# Patient Record
Sex: MALE | Race: WHITE | Employment: FULL TIME | URBAN - METROPOLITAN AREA
[De-identification: names, ages, dates, MRNs, and addresses within clinical notes are randomized per-mention and may not be internally consistent; named-entity substitution may affect disease eponyms.]

---

## 2019-08-01 ENCOUNTER — APPOINTMENT (OUTPATIENT)
Dept: CT IMAGING | Facility: CLINIC | Age: 34
End: 2019-08-01
Attending: FAMILY MEDICINE
Payer: COMMERCIAL

## 2019-08-01 ENCOUNTER — HOSPITAL ENCOUNTER (EMERGENCY)
Facility: CLINIC | Age: 34
Discharge: HOME | End: 2019-08-01
Attending: FAMILY MEDICINE | Admitting: FAMILY MEDICINE
Payer: COMMERCIAL

## 2019-08-01 VITALS
RESPIRATION RATE: 16 BRPM | TEMPERATURE: 97.8 F | WEIGHT: 220 LBS | OXYGEN SATURATION: 99 % | HEART RATE: 80 BPM | SYSTOLIC BLOOD PRESSURE: 114 MMHG | DIASTOLIC BLOOD PRESSURE: 75 MMHG

## 2019-08-01 DIAGNOSIS — R10.32 LLQ ABDOMINAL PAIN: ICD-10-CM

## 2019-08-01 LAB
ALBUMIN SERPL-MCNC: 4.1 G/DL (ref 3.4–5)
ALP SERPL-CCNC: 69 U/L (ref 40–150)
ALT SERPL W P-5'-P-CCNC: 40 U/L (ref 0–70)
ANION GAP SERPL CALCULATED.3IONS-SCNC: 8 MMOL/L (ref 3–14)
AST SERPL W P-5'-P-CCNC: 20 U/L (ref 0–45)
BASOPHILS # BLD AUTO: 0.1 10E9/L (ref 0–0.2)
BASOPHILS NFR BLD AUTO: 0.8 %
BILIRUB SERPL-MCNC: 0.3 MG/DL (ref 0.2–1.3)
BUN SERPL-MCNC: 18 MG/DL (ref 7–30)
CALCIUM SERPL-MCNC: 9.2 MG/DL (ref 8.5–10.1)
CHLORIDE SERPL-SCNC: 107 MMOL/L (ref 94–109)
CO2 SERPL-SCNC: 26 MMOL/L (ref 20–32)
CREAT SERPL-MCNC: 0.85 MG/DL (ref 0.66–1.25)
CRP SERPL-MCNC: <2.9 MG/L (ref 0–8)
DIFFERENTIAL METHOD BLD: NORMAL
EOSINOPHIL # BLD AUTO: 0.3 10E9/L (ref 0–0.7)
EOSINOPHIL NFR BLD AUTO: 3.1 %
ERYTHROCYTE [DISTWIDTH] IN BLOOD BY AUTOMATED COUNT: 12.9 % (ref 10–15)
GFR SERPL CREATININE-BSD FRML MDRD: >90 ML/MIN/{1.73_M2}
GLUCOSE SERPL-MCNC: 86 MG/DL (ref 70–99)
HCT VFR BLD AUTO: 43.2 % (ref 40–53)
HGB BLD-MCNC: 14 G/DL (ref 13.3–17.7)
IMM GRANULOCYTES # BLD: 0 10E9/L (ref 0–0.4)
IMM GRANULOCYTES NFR BLD: 0.3 %
LYMPHOCYTES # BLD AUTO: 2.8 10E9/L (ref 0.8–5.3)
LYMPHOCYTES NFR BLD AUTO: 33 %
MCH RBC QN AUTO: 27.8 PG (ref 26.5–33)
MCHC RBC AUTO-ENTMCNC: 32.4 G/DL (ref 31.5–36.5)
MCV RBC AUTO: 86 FL (ref 78–100)
MONOCYTES # BLD AUTO: 0.7 10E9/L (ref 0–1.3)
MONOCYTES NFR BLD AUTO: 7.8 %
NEUTROPHILS # BLD AUTO: 4.7 10E9/L (ref 1.6–8.3)
NEUTROPHILS NFR BLD AUTO: 55 %
NRBC # BLD AUTO: 0 10*3/UL
NRBC BLD AUTO-RTO: 0 /100
PLATELET # BLD AUTO: 281 10E9/L (ref 150–450)
POTASSIUM SERPL-SCNC: 4 MMOL/L (ref 3.4–5.3)
PROT SERPL-MCNC: 7.9 G/DL (ref 6.8–8.8)
RBC # BLD AUTO: 5.04 10E12/L (ref 4.4–5.9)
SODIUM SERPL-SCNC: 141 MMOL/L (ref 133–144)
WBC # BLD AUTO: 8.6 10E9/L (ref 4–11)

## 2019-08-01 PROCEDURE — 96375 TX/PRO/DX INJ NEW DRUG ADDON: CPT | Performed by: FAMILY MEDICINE

## 2019-08-01 PROCEDURE — 99285 EMERGENCY DEPT VISIT HI MDM: CPT | Mod: 25 | Performed by: FAMILY MEDICINE

## 2019-08-01 PROCEDURE — 74177 CT ABD & PELVIS W/CONTRAST: CPT

## 2019-08-01 PROCEDURE — 99285 EMERGENCY DEPT VISIT HI MDM: CPT | Mod: Z6 | Performed by: FAMILY MEDICINE

## 2019-08-01 PROCEDURE — 85025 COMPLETE CBC W/AUTO DIFF WBC: CPT | Performed by: FAMILY MEDICINE

## 2019-08-01 PROCEDURE — 25000128 H RX IP 250 OP 636: Performed by: FAMILY MEDICINE

## 2019-08-01 PROCEDURE — 80053 COMPREHEN METABOLIC PANEL: CPT | Performed by: FAMILY MEDICINE

## 2019-08-01 PROCEDURE — 96374 THER/PROPH/DIAG INJ IV PUSH: CPT | Mod: 59 | Performed by: FAMILY MEDICINE

## 2019-08-01 PROCEDURE — 86140 C-REACTIVE PROTEIN: CPT | Performed by: FAMILY MEDICINE

## 2019-08-01 RX ORDER — ONDANSETRON 2 MG/ML
4 INJECTION INTRAMUSCULAR; INTRAVENOUS
Status: DISCONTINUED | OUTPATIENT
Start: 2019-08-01 | End: 2019-08-02 | Stop reason: HOSPADM

## 2019-08-01 RX ORDER — IOPAMIDOL 755 MG/ML
100 INJECTION, SOLUTION INTRAVASCULAR ONCE
Status: COMPLETED | OUTPATIENT
Start: 2019-08-01 | End: 2019-08-01

## 2019-08-01 RX ORDER — KETOROLAC TROMETHAMINE 15 MG/ML
15 INJECTION, SOLUTION INTRAMUSCULAR; INTRAVENOUS ONCE
Status: COMPLETED | OUTPATIENT
Start: 2019-08-01 | End: 2019-08-01

## 2019-08-01 RX ADMIN — KETOROLAC TROMETHAMINE 15 MG: 15 INJECTION, SOLUTION INTRAMUSCULAR; INTRAVENOUS at 21:01

## 2019-08-01 RX ADMIN — ONDANSETRON 4 MG: 2 INJECTION INTRAMUSCULAR; INTRAVENOUS at 20:59

## 2019-08-01 RX ADMIN — IOPAMIDOL 500 ML: 755 INJECTION, SOLUTION INTRAVENOUS at 21:28

## 2019-08-01 NOTE — ED AVS SNAPSHOT
[facility] Emergency Department  [address] [address]  Phone:  [phone]  Fax:  [phone]                                    Emir Glass   MRN: [id number]    Department:  [facility] Emergency Department   Date of Visit:  8/1/2019           After Visit Summary Signature Page    I have received my discharge instructions, and my questions have been answered. I have discussed any challenges I see with this plan with the nurse or doctor.    ..........................................................................................................................................  Patient/Patient Representative Signature      ..........................................................................................................................................  Patient Representative Print Name and Relationship to Patient    ..................................................               ................................................  Date                                   Time    ..........................................................................................................................................  Reviewed by Signature/Title    ...................................................              ..............................................  Date                                               Time          22EPIC Rev 08/18

## 2019-08-02 NOTE — ED PROVIDER NOTES
HPI   The patient is a 33-year-old male presenting with right lower quadrant abdominal pain.  Comes from the urgency center where they told him or his urine was unremarkable but that he needed imaging to look for appendicitis.  The patient does not have a known history of surgery.  He does not take medication on a regular basis.  He does not smoke.  He does not use drugs of abuse.  He does not drink alcohol to excess.    The patient had onset of right lower quadrant pain starting yesterday at about 4:00 PM.  The pain is been constant since onset though it did seem to lessen overnight as he was able to sleep.  Throughout the day today his pain is been constant and steady, progressively worsened with time.        Allergies:  No Known Allergies  Problem List:    There are no active problems to display for this patient.     Past Medical History:    No past medical history on file.  Past Surgical History:    No past surgical history on file.  Family History:    No family history on file.  Social History:  Marital Status:   [2]  Social History     Tobacco Use     Smoking status: Not on file   Substance Use Topics     Alcohol use: Not on file     Drug use: Not on file      Medications:      No current outpatient medications on file.  Review of Systems   All other systems reviewed and are negative.      PE   BP: (!) 163/130  Pulse: 102  Temp: 97.7  F (36.5  C)  Resp: 16  Weight: 99.8 kg (220 lb)  SpO2: 98 %  Physical Exam   Constitutional: He is oriented to person, place, and time. He appears distressed.   Mild pain.   HENT:   Head: Atraumatic.   Mouth/Throat: Oropharynx is clear and moist.   Eyes: Pupils are equal, round, and reactive to light. EOM are normal. No scleral icterus.   Neck: Normal range of motion.   Cardiovascular: Normal heart sounds and intact distal pulses.   Pulmonary/Chest: Breath sounds normal. No respiratory distress.   Abdominal: Soft. Bowel sounds are normal. There is tenderness in the right  lower quadrant.   Tenderness in the right lower quadrant.  Soft throughout.  No grimace or evidence of guarding.   Musculoskeletal: He exhibits no edema or tenderness.   Neurological: He is alert and oriented to person, place, and time.   Skin: Skin is warm. No rash noted. He is not diaphoretic.   Psychiatric: He has a normal mood and affect. His behavior is normal.   Nursing note and vitals reviewed.      ED COURSE and [facility]   2234.  The patient presented with right lower quadrant pain and tenderness.  His white blood cell count is negative.  I spoke with the general surgeon on who is Dr. Keating.  He was able to look at the CT images himself and was not impressed with the changes described by radiology.  His recommendation was to a CRP to the blood drawn.  If this is positive, the patient will be admitted summation this.  If this is negative, the patient will be discharged home and follow-up in the clinic.  I reviewed all of this with the patient and he agrees with the plan.    LABS  Labs Ordered and Resulted from Time of ED Arrival Up to the Time of Departure from the ED   CBC WITH PLATELETS DIFFERENTIAL   COMPREHENSIVE METABOLIC PANEL   CRP INFLAMMATION       IMAGING  Images reviewed by me.  Radiology report also reviewed.  Abd/pelvis CT, IV contrast only TRAUMA  / AAA   Preliminary Result   IMPRESSION:    1. Mildly prominent appendix, cannot exclude very early appendicitis   but no other signs for acute appendicitis and clinical correlation   needed or consider short-term follow-up.   2. There are a few mildly prominent mesenteric nodes in the right   midabdomen which are nonspecific, adjacent mesenteric fat appears   normal.                         Medications   ondansetron (ZOFRAN) injection 4 mg (4 mg Intravenous Given 8/1/19 2059)   ketorolac (TORADOL) injection 15 mg (15 mg Intravenous Given 8/1/19 2101)   iopamidol (ISOVUE-370) solution 100 mL (500 mLs Intravenous Given 8/1/19 2128)   sodium chloride (PF)  0.9% PF flush 100 mL (100 mLs Intravenous Given 8/1/19 2126)         IMPRESSION       ICD-10-CM    1. LLQ abdominal pain R10.32             Medication List      There are no discharge medications for this visit.                       Pillo Fletcher MD  08/01/19 5363

## 2019-08-02 NOTE — PROGRESS NOTES
Called by ED for possible early appendicitis.    CT scan equivocal with no inflammation seen and no free fluid in the pelvis    No leukocytosis    No tachycardia to tachypnea or hypotension noted in chart.    Recommended CRP to be ordered.    If elevated, recommend observation status w/out abx and re-evaluation in the AM by surgery.    If negative, should be safe for discharge with follow-up if the ED physician feels comfortable.

## 2019-08-02 NOTE — DISCHARGE INSTRUCTIONS
Return to the Emergency Room if the following occurs:     Severely worsened pain, fever >101, vomiting/dehydration, or for any concern at anytime.    Or, follow-up with the following provider as we discussed:     Return to your primary doctor or the general surgery clinic as needed, or if not improved over the weekend.    Medications discussed:    Consider stool softener for possible constipation.    If you received pain-relieving or sedating medication during your time in the ER, avoid alcohol, driving automobiles, or working with machinery.  Also, a responsible adult must stay with you.        Call the Nurse Advice Line at [phone] or [phone] for any concern at anytime.

## 2025-06-27 ENCOUNTER — HOSPITAL ENCOUNTER (EMERGENCY)
Facility: CLINIC | Age: 40
Discharge: HOME | End: 2025-06-27
Attending: EMERGENCY MEDICINE | Admitting: EMERGENCY MEDICINE
Payer: COMMERCIAL

## 2025-06-27 VITALS
WEIGHT: 230 LBS | SYSTOLIC BLOOD PRESSURE: 152 MMHG | HEIGHT: 70 IN | HEART RATE: 124 BPM | TEMPERATURE: 98.1 F | RESPIRATION RATE: 18 BRPM | BODY MASS INDEX: 32.93 KG/M2 | DIASTOLIC BLOOD PRESSURE: 106 MMHG

## 2025-06-27 DIAGNOSIS — S01.01XA SCALP LACERATION, INITIAL ENCOUNTER: ICD-10-CM

## 2025-06-27 PROCEDURE — 12004 RPR S/N/AX/GEN/TRK7.6-12.5CM: CPT | Performed by: EMERGENCY MEDICINE

## 2025-06-27 PROCEDURE — 250N000013 HC RX MED GY IP 250 OP 250 PS 637: Performed by: EMERGENCY MEDICINE

## 2025-06-27 PROCEDURE — 99283 EMERGENCY DEPT VISIT LOW MDM: CPT | Mod: 25 | Performed by: EMERGENCY MEDICINE

## 2025-06-27 RX ORDER — IBUPROFEN 600 MG/1
600 TABLET, FILM COATED ORAL ONCE
Status: COMPLETED | OUTPATIENT
Start: 2025-06-27 | End: 2025-06-27

## 2025-06-27 RX ADMIN — IBUPROFEN 600 MG: 600 TABLET ORAL at 17:03

## 2025-06-27 ASSESSMENT — COLUMBIA-SUICIDE SEVERITY RATING SCALE - C-SSRS
2. HAVE YOU ACTUALLY HAD ANY THOUGHTS OF KILLING YOURSELF IN THE PAST MONTH?: NO
1. IN THE PAST MONTH, HAVE YOU WISHED YOU WERE DEAD OR WISHED YOU COULD GO TO SLEEP AND NOT WAKE UP?: NO
6. HAVE YOU EVER DONE ANYTHING, STARTED TO DO ANYTHING, OR PREPARED TO DO ANYTHING TO END YOUR LIFE?: NO

## 2025-06-27 ASSESSMENT — ACTIVITIES OF DAILY LIVING (ADL): ADLS_ACUITY_SCORE: 41

## 2025-06-27 NOTE — DISCHARGE INSTRUCTIONS
Tylenol and/or Advil as needed for discomfort  Keep wound clean and dry  Suture removal 8 to 10 days

## 2025-06-27 NOTE — ED PROVIDER NOTES
"  History     Chief Complaint   Patient presents with    Head Laceration     HPI  Emir Glass is a 39 year old male who was clearing out a wood  a short time ago when the shoot dropped on his head causing a laceration to the top of it.  He had no loss of consciousness.  He denies nausea or vomiting.  He has some head pain at the laceration site.  He has no nausea or vomiting.  He denies neck pain.  He has no fever or chills.  He has no numbness or weakness.  He denies chest pain or shortness of breath.  He denies other injuries.  His tetanus is up-to-date.    Allergies:  No Known Allergies    Problem List:    There are no active problems to display for this patient.       Past Medical History:    No past medical history on file.    Past Surgical History:    No past surgical history on file.    Family History:    No family history on file.    Social History:  Marital Status:   [2]        Medications:    No current outpatient medications on file.        Review of Systems   Constitutional:  Negative for chills and fever.   Respiratory:  Negative for shortness of breath.    Cardiovascular:  Negative for chest pain.   Gastrointestinal:  Negative for nausea and vomiting.   Musculoskeletal:  Negative for neck pain.   Skin:  Positive for wound.   Neurological:  Positive for headaches. Negative for weakness and numbness.       Physical Exam   BP: (!) 152/106  Pulse: (!) 124  Temp: 98.1  F (36.7  C)  Resp: 18  Height: 177.8 cm (5' 10\")  Weight: 104.3 kg (230 lb)      Physical Exam  Vitals and nursing note reviewed.   Constitutional:       General: He is not in acute distress.     Appearance: He is well-developed. He is not diaphoretic.   HENT:      Head: Normocephalic.     Eyes:      General: No scleral icterus.     Pupils: Pupils are equal, round, and reactive to light.   Cardiovascular:      Rate and Rhythm: Normal rate and regular rhythm.      Heart sounds: Normal heart sounds. No murmur " heard.  Pulmonary:      Effort: No respiratory distress.      Breath sounds: No stridor. No wheezing or rales.   Abdominal:      General: Bowel sounds are normal.      Palpations: Abdomen is soft.      Tenderness: There is no abdominal tenderness.   Musculoskeletal:         General: No tenderness.      Cervical back: Normal range of motion and neck supple.   Skin:     General: Skin is warm and dry.      Coloration: Skin is not pale.      Findings: No erythema or rash.   Neurological:      Mental Status: He is alert and oriented to person, place, and time.      Sensory: No sensory deficit.      Coordination: Coordination normal.         ED Course        Procedures  Patient's wound was infiltrated with 6 cc 1% lidocaine with epinephrine.  It was cleansed with Hibiclens.  The wound was explored.  It was then sutured with 7 3-0 Ethilon sutures.  A dressing was applied.  Patient tolerated the procedure well.                No results found for this or any previous visit (from the past 24 hours).    Medications   ibuprofen (ADVIL/MOTRIN) tablet 600 mg (600 mg Oral $Given 6/27/25 2304)   1%Lidocaine with epinephrine    Assessments & Plan (with Medical Decision Making)     I have reviewed the nursing notes.    I have reviewed the findings, diagnosis, plan and need for follow up with the patient.    39 year old male who was clearing out a wood  a short time ago when the chute dropped on his head causing a laceration to the top of it.  He had no loss of consciousness.  He denies nausea or vomiting.  He has some head pain at the laceration site.  He has no nausea or vomiting.  He denies neck pain.  He has no fever or chills.  He has no numbness or weakness.  He denies chest pain or shortness of breath.  He denies other injuries.  His tetanus is up-to-date.    On exam, patient has a blood pressure 152/106 with a pulse rate of 124.  He has an 8 cm laceration on the top of his head.  The nurse thought there was 2  lacerations to this area, however the apparent second laceration was dried blood and not an actual laceration.  The wound was repaired.  I do not believe that he requires a head CT as he had no loss of consciousness, significant headache, seizures, or recurrent vomiting.  He was given ibuprofen for pain.  He requested nothing stronger.  Wound care instructions were provided.    He was instructed on suture removal in 8 to 10 days.            There are no discharge medications for this patient.      Final diagnoses:   Scalp laceration, initial encounter       6/27/2025   [facility] EMERGENCY DEPT       [facility] HospitalMarciano morris MD  06/28/25 2600

## 2025-06-27 NOTE — ED TRIAGE NOTES
Patient was working with a wood  this afternoon clearing out debris from the chute when it dropped on his head and caused a laceration. Laceration is in an L shape at the top of the scalp, 5-6cm each. Bleeding controlled and gauze applied in triage.     Triage Assessment (Adult)       Row Name 06/27/25 1542          Triage Assessment    Airway WDL WDL        Respiratory WDL    Respiratory WDL WDL        Cardiac WDL    Cardiac WDL X     Cardiac Rhythm ST        Peripheral/Neurovascular WDL    Peripheral Neurovascular WDL WDL        Cognitive/Neuro/Behavioral WDL    Cognitive/Neuro/Behavioral WDL WDL

## 2025-06-28 ASSESSMENT — ENCOUNTER SYMPTOMS
WEAKNESS: 0
CHILLS: 0
SHORTNESS OF BREATH: 0
NAUSEA: 0
FEVER: 0
WOUND: 1
HEADACHES: 1
NECK PAIN: 0
VOMITING: 0
NUMBNESS: 0